# Patient Record
Sex: MALE | Race: WHITE | NOT HISPANIC OR LATINO | Employment: FULL TIME | URBAN - NONMETROPOLITAN AREA
[De-identification: names, ages, dates, MRNs, and addresses within clinical notes are randomized per-mention and may not be internally consistent; named-entity substitution may affect disease eponyms.]

---

## 2019-05-28 ENCOUNTER — HOSPITAL ENCOUNTER (OUTPATIENT)
Dept: GENERAL RADIOLOGY | Facility: HOSPITAL | Age: 29
Discharge: HOME | End: 2019-05-28

## 2019-05-28 ENCOUNTER — HOSPITAL ENCOUNTER (OUTPATIENT)
Dept: GENERAL RADIOLOGY | Facility: HOSPITAL | Age: 29
Discharge: HOME | End: 2019-05-28
Admitting: NURSE PRACTITIONER

## 2019-05-28 ENCOUNTER — TRANSCRIBE ORDERS (OUTPATIENT)
Dept: GENERAL RADIOLOGY | Facility: HOSPITAL | Age: 29
End: 2019-05-28

## 2019-05-28 DIAGNOSIS — M25.571 RIGHT ANKLE PAIN, UNSPECIFIED CHRONICITY: Primary | ICD-10-CM

## 2019-05-28 DIAGNOSIS — M25.579 PAIN IN JOINT INVOLVING ANKLE AND FOOT: ICD-10-CM

## 2019-05-28 DIAGNOSIS — M79.673 PAIN OF FOOT, UNSPECIFIED LATERALITY: ICD-10-CM

## 2019-05-28 PROCEDURE — 73610 X-RAY EXAM OF ANKLE: CPT

## 2019-05-28 PROCEDURE — 73630 X-RAY EXAM OF FOOT: CPT

## 2020-08-18 ENCOUNTER — HOSPITAL ENCOUNTER (EMERGENCY)
Facility: HOSPITAL | Age: 30
Discharge: HOME | End: 2020-08-19
Attending: EMERGENCY MEDICINE | Admitting: EMERGENCY MEDICINE

## 2020-08-18 VITALS
RESPIRATION RATE: 18 BRPM | BODY MASS INDEX: 21.87 KG/M2 | SYSTOLIC BLOOD PRESSURE: 127 MMHG | WEIGHT: 165 LBS | HEART RATE: 121 BPM | HEIGHT: 73 IN | DIASTOLIC BLOOD PRESSURE: 85 MMHG | OXYGEN SATURATION: 98 % | TEMPERATURE: 99.3 F

## 2020-08-18 DIAGNOSIS — R00.0 TACHYCARDIA: ICD-10-CM

## 2020-08-18 DIAGNOSIS — R05.9 COUGH: ICD-10-CM

## 2020-08-18 DIAGNOSIS — Z20.822 PERSON UNDER INVESTIGATION FOR COVID-19: Primary | ICD-10-CM

## 2020-08-18 DIAGNOSIS — R50.9 FEVER AND CHILLS: ICD-10-CM

## 2020-08-18 PROCEDURE — 99283 EMERGENCY DEPT VISIT LOW MDM: CPT

## 2020-08-19 ENCOUNTER — APPOINTMENT (OUTPATIENT)
Dept: GENERAL RADIOLOGY | Facility: HOSPITAL | Age: 30
End: 2020-08-19

## 2020-08-19 LAB
REF LAB TEST METHOD: NORMAL
SARS-COV-2 RNA RESP QL NAA+PROBE: NOT DETECTED

## 2020-08-19 PROCEDURE — U0004 COV-19 TEST NON-CDC HGH THRU: HCPCS | Performed by: EMERGENCY MEDICINE

## 2020-08-19 PROCEDURE — 71045 X-RAY EXAM CHEST 1 VIEW: CPT

## 2020-08-19 PROCEDURE — U0002 COVID-19 LAB TEST NON-CDC: HCPCS | Performed by: EMERGENCY MEDICINE

## 2020-08-19 RX ORDER — ACETAMINOPHEN 500 MG
1000 TABLET ORAL ONCE
Status: COMPLETED | OUTPATIENT
Start: 2020-08-19 | End: 2020-08-19

## 2020-08-19 RX ADMIN — ACETAMINOPHEN 1000 MG: 500 TABLET, FILM COATED ORAL at 01:53

## 2020-08-19 RX ADMIN — SODIUM CHLORIDE 1000 ML: 9 INJECTION, SOLUTION INTRAVENOUS at 01:53

## 2020-08-19 NOTE — ED PROVIDER NOTES
EMERGENCY DEPARTMENT ENCOUNTER      Pt Name: Gustavo Ocampo  MRN: [id number]  YOB: 1990  Date of evaluation: 8/18/2020  Provider: Khoi Trevino MD    CHIEF COMPLAINT       Chief Complaint   Patient presents with   • Exposure To Known Illness         HISTORY OF PRESENT ILLNESS  (Location/Symptom, Timing/Onset, Context/Setting, Quality, Duration, Modifying Factors, Severity.)   Gustavo Ocampo is a 30 y.o. male who presents to the emergency department with 3 days of progressive cough, body aches, fever to 102 without any associated abdominal pain, vomiting, or diarrhea all of which is moderate in severity and is improved with Tylenol and Motrin.  Patient works at [employer] and so he is concerned that he may have been exposed to COVID-19.  He has some sore throat as well as well as mild headache.  Denies any other infectious exposures.  Denies any significant shortness of breath or chest pain.      Nursing notes were reviewed.    REVIEW OF SYSTEMS    (2-9 systems for level 4, 10 or more for level 5)   ROS:  General:  + fever  Cardiovascular:  No chest pain, no palpitations  Respiratory:  + cough  Gastrointestinal:  No pain, no nausea, no vomiting, no diarrhea  Musculoskeletal:  No muscle pain, no joint pain  Skin:  No rash, no easy bruising  Neurologic:  No speech problems,no extremity numbness, no extremity tingling, no extremity weakness  Psychiatric:  No anxiety  Genitourinary:  No dysuria, no hematuria    Except as noted above the remainder of the review of systems was reviewed and negative.       PAST MEDICAL HISTORY   No past medical history on file.      SURGICAL HISTORY       Past Surgical History:   Procedure Laterality Date   • EAR TUBES      x 3   • HERNIA REPAIR      abd         CURRENT MEDICATIONS     No current facility-administered medications for this encounter.     Current Outpatient Medications:   •  brompheniramine-pseudoephedrine-DM 30-2-10 MG/5ML syrup, Take 10 mL by mouth 4 (Four)  "Times a Day As Needed for Congestion or Cough., Disp: 118 mL, Rfl: 0  •  cetirizine (zyrTEC) 10 MG tablet, Take 1 tablet by mouth Daily., Disp: 30 tablet, Rfl: 0  •  TRUVADA 200-300 MG per tablet, , Disp: , Rfl: 0    ALLERGIES     Influenza vaccines    FAMILY HISTORY       Family History   Problem Relation Age of Onset   • No Known Problems Mother    • No Known Problems Father           SOCIAL HISTORY       Social History     Socioeconomic History   • Marital status: Single     Spouse name: Not on file   • Number of children: Not on file   • Years of education: Not on file   • Highest education level: Not on file   Tobacco Use   • Smoking status: Never Smoker   • Smokeless tobacco: Never Used   Substance and Sexual Activity   • Alcohol use: Yes         PHYSICAL EXAM    (up to 7 for level 4, 8 or more for level 5)     Vitals:    08/18/20 2309   BP: 127/85   BP Location: Left arm   Patient Position: Sitting   Pulse: (!) 121   Resp: 18   Temp: 99.3 °F (37.4 °C)   SpO2: 98%   Weight: 74.8 kg (165 lb)   Height: 185.4 cm (73\")       Physical Exam  General: Awake, alert, no acute distress.  HEENT: Conjunctiva normal.  Neck: Trachea midline.  Cardiac: Heart regular rate, rhythm, no murmurs, rubs, or gallops  Lungs: Lungs are clear to auscultation, there is no wheezing, rhonchi, or rales. There is no use of accessory muscles.  Chest wall: There is no tenderness to palpation over the chest wall or over ribs  Abdomen: Abdomen is soft, nontender, nondistended. There is no firm or pulsatile masses, no rebound rigidity or guarding.   Musculoskeletal: No deformity.  Neuro: Alert and oriented x 4.  Dermatology: Skin is warm and dry  Psych: Mentation is grossly normal, cognition is grossly normal. Affect is appropriate.        DIAGNOSTIC RESULTS       RADIOLOGY:   Non-plain film images such as CT, Ultrasound and MRI are read by the radiologist. Plain radiographic images are visualized and preliminarily interpreted by the emergency " "physician with the below findings:      [x] Radiologist's Report Reviewed:  XR Chest 1 View   Final Result   No acute cardiopulmonary findings.      Signer Name: Sawyer Garcia MD    Signed: 8/19/2020 2:04 AM    Workstation Name: [email or web address]     [facility]                EMERGENCY DEPARTMENT COURSE and DIFFERENTIAL DIAGNOSIS/MDM:   Vitals:    Vitals:    08/18/20 2309   BP: 127/85   BP Location: Left arm   Patient Position: Sitting   Pulse: (!) 121   Resp: 18   Temp: 99.3 °F (37.4 °C)   SpO2: 98%   Weight: 74.8 kg (165 lb)   Height: 185.4 cm (73\")       ED Course as of Aug 19 0329   Wed Aug 19, 2020   0314 XR reviewed and negative for acute process    [NS]   0323 Patient continues to be very well-appearing.  His tachycardia has resolved.  I have canceled laboratory studies at this time and advised him on self-isolation until we obtain his COVID swab results.    [NS]      ED Course User Index  [NS] Khoi Trevino MD       Patient well-appearing throughout the duration of his stay in the emergency department.  He initially was slightly tachycardic, however this was upon arrival.  This resolved after administration of fluids and Tylenol.  Patient is nontoxic-appearing and clinically doubt systemic infection/sepsis.  His chest x-ray is clear for pneumonia, pneumothorax, or other acute thoracic process.  High suspicion for COVID-19 given symptomatology.  He has no abdominal pain or tenderness on examination to suggest acute abdominal process such as cholecystitis or appendicitis.  Do not feel this represents other cause of flulike syndrome such as epidural abscess or acute HIV.  Patient be discharged to home isolation.    I had a discussion with the patient/family regarding diagnosis, diagnostic results, treatment plan, and medications.  The patient/family indicated understanding of these instructions.  I spent adequate time at the bedside proceeding discharge necessary to personally discuss the " aftercare instructions, giving patient education, providing explanations of the results of our evaluations/findings, and my decision making to assure that the patient/family understand the plan of care.  Time was allotted to answer questions at that time and throughout the ED course.  Emphasis was placed on timely follow-up after discharge.  I also discussed the potential for the development of an acute emergent condition requiring further evaluation, admission, or even surgical intervention. I discussed that we found nothing during the visit today indicating the need for further workup, admission, or the presence of an unstable medical condition.  I encouraged the patient to return to the emergency department immediately for ANY concerns, worsening, new complaints, or if symptoms persist and unable to seek follow-up in a timely fashion.  The patient/family expressed understanding and agreement with this plan.  The patient will follow-up with their PCP in 1-2 days for reevaluation.       MEDICATIONS ADMINISTERED IN ED:  Medications   sodium chloride 0.9 % bolus 1,000 mL (1,000 mL Intravenous New Bag 8/19/20 0153)   acetaminophen (TYLENOL) tablet 1,000 mg (1,000 mg Oral Given 8/19/20 0153)             FINAL IMPRESSION      1. Person under investigation for COVID-19    2. Fever and chills    3. Cough    4. Tachycardia          DISPOSITION/PLAN     ED Disposition     ED Disposition Condition Comment    Discharge Stable           PATIENT REFERRED TO:  Maty Peralta MD  [address] [facility] [address] [phone]    In 1 week      [facility] Emergency Department  [address] [facility] [place] [address] [phone]    If symptoms worsen      DISCHARGE MEDICATIONS:     Medication List      CONTINUE taking these medications    brompheniramine-pseudoephedrine-DM 30-2-10 MG/5ML syrup  Take 10 mL by mouth 4 (Four) Times a Day As Needed for Congestion or   Cough.     cetirizine 10  MG tablet  Commonly known as:  zyrTEC  Take 1 tablet by mouth Daily.     Truvada 200-300 MG per tablet  Generic drug:  emtricitabine-tenofovir              Comment: Please note this report has been produced using speech recognition software.      Khoi Trevino MD  Attending Emergency Physician               Khoi Trevino MD  08/19/20 8573

## 2021-04-23 ENCOUNTER — APPOINTMENT (OUTPATIENT)
Dept: GENERAL RADIOLOGY | Facility: HOSPITAL | Age: 31
End: 2021-04-23

## 2021-04-23 ENCOUNTER — HOSPITAL ENCOUNTER (EMERGENCY)
Facility: HOSPITAL | Age: 31
Discharge: HOME | End: 2021-04-23
Attending: EMERGENCY MEDICINE | Admitting: EMERGENCY MEDICINE

## 2021-04-23 VITALS
HEART RATE: 73 BPM | BODY MASS INDEX: 21.87 KG/M2 | HEIGHT: 73 IN | DIASTOLIC BLOOD PRESSURE: 80 MMHG | TEMPERATURE: 98.1 F | OXYGEN SATURATION: 97 % | RESPIRATION RATE: 18 BRPM | WEIGHT: 165 LBS | SYSTOLIC BLOOD PRESSURE: 131 MMHG
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
## 2021-04-23 DIAGNOSIS — S20.212A CONTUSION OF RIB ON LEFT SIDE, INITIAL ENCOUNTER: ICD-10-CM

## 2021-04-23 DIAGNOSIS — S39.012A LUMBAR STRAIN, INITIAL ENCOUNTER: Primary | ICD-10-CM

## 2021-04-23 PROCEDURE — 72100 X-RAY EXAM L-S SPINE 2/3 VWS: CPT

## 2021-04-23 PROCEDURE — 99282 EMERGENCY DEPT VISIT SF MDM: CPT

## 2021-04-23 PROCEDURE — 71046 X-RAY EXAM CHEST 2 VIEWS: CPT

## 2021-04-23 NOTE — DISCHARGE INSTRUCTIONS
Rest.  Take over the counter ibuprofen or Aleve as directed for pain.  Follow up with your PCP or [facility] if symptoms persist.

## 2021-04-23 NOTE — ED PROVIDER NOTES
"Subjective   30-year-old male presents to the emergency department for evaluation after a slip and fall at work.  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  He landed on his bottom.  He complains of low back pain and some rib \"soreness\", mostly on the left.  No loss of consciousness.  He did not strike his head.  No neck pain.  No shortness of breath.  No abdominal pain.  The patient states that he broke a glass beaker when he fell.  He sustained 2 tiny lacerations to the dorsal aspect of both hands.  No active bleeding.  No retained foreign body.  The patient is up-to-date on his tetanus.  He denies any other injury.          Review of Systems   Constitutional: Negative for chills and fever.   HENT: Negative for sore throat.    Respiratory: Negative for cough and shortness of breath.         Left rib soreness   Cardiovascular: Negative for chest pain.   Gastrointestinal: Negative for abdominal pain, diarrhea, nausea and vomiting.   Genitourinary: Negative for dysuria.   Musculoskeletal: Positive for back pain.   Skin: Positive for wound (Tiny lacerations to both hands).   Neurological: Negative for dizziness, light-headedness, numbness and headaches.   Hematological: Negative.    Psychiatric/Behavioral: Negative.        History reviewed. No pertinent past medical history.    Allergies   Allergen Reactions   • Influenza Vaccines Rash       Past Surgical History:   Procedure Laterality Date   • EAR TUBES      x 3   • HERNIA REPAIR      abd       Family History   Problem Relation Age of Onset   • No Known Problems Mother    • No Known Problems Father        Social History     Socioeconomic History   • Marital status: Single     Spouse name: Not on file   • Number of children: Not on file   • Years of education: Not on file   • Highest education level: Not on file   Tobacco Use   • Smoking status: Never Smoker   • Smokeless tobacco: Never Used   Substance " and Sexual Activity   • Alcohol use: Yes     Alcohol/week: 1.0 standard drinks     Types: 1 Cans of beer per week   • Drug use: Never           Objective   Physical Exam  Constitutional:       Appearance: Normal appearance.   HENT:      Head: Normocephalic and atraumatic.      Nose: Nose normal.      Mouth/Throat:      Mouth: Mucous membranes are moist.   Eyes:      Conjunctiva/sclera: Conjunctivae normal.      Pupils: Pupils are equal, round, and reactive to light.   Cardiovascular:      Rate and Rhythm: Normal rate and regular rhythm.      Pulses: Normal pulses.      Heart sounds: Normal heart sounds.   Pulmonary:      Effort: Pulmonary effort is normal.      Breath sounds: Normal breath sounds.      Comments: Mild soreness on palpation over the left lateral ribs.  No crepitus.  Abdominal:      General: Bowel sounds are normal.      Tenderness: There is no abdominal tenderness. There is no guarding.   Musculoskeletal:         General: Normal range of motion.      Cervical back: Normal range of motion and neck supple. No tenderness.      Comments: Mild tenderness on palpation over the lower lumbar spine.  No point tenderness.   Skin:     General: Skin is warm and dry.      Comments: 2 tiny subcentimeter lacerations to the dorsum of the left and right hand.  No active bleeding.  No foreign body.   Neurological:      General: No focal deficit present.      Mental Status: He is alert and oriented to person, place, and time.   Psychiatric:         Mood and Affect: Mood normal.         Procedures           ED Course      Lumbar spine films show no evidence of fracture or malalignment.  Chest x-ray shows no evidence of rib fracture or pneumothorax or other acute process.  The patient be discharged home to take over-the-counter ibuprofen or Aleve as directed for pain.                                       MDM    Final diagnoses:   Lumbar strain, initial encounter   Contusion of rib on left side, initial encounter   Fall,  initial encounter       ED Disposition  ED Disposition     ED Disposition Condition Comment    Discharge Stable           [facility] OCCUPATIONAL MEDICINE  [address]  [facility] [id number] [phone]    As needed    [facility] Emergency Department  [address] [facility] [address] [phone]    If symptoms worsen         Medication List      No changes were made to your prescriptions during this visit.          Roverto Arredondo, PA  04/23/21 1340

## 2021-07-20 PROCEDURE — 96372 THER/PROPH/DIAG INJ SC/IM: CPT

## 2021-07-20 PROCEDURE — 99283 EMERGENCY DEPT VISIT LOW MDM: CPT

## 2021-07-20 RX ORDER — AZITHROMYCIN 250 MG/1
1000 TABLET, FILM COATED ORAL ONCE
Status: COMPLETED | OUTPATIENT
Start: 2021-07-20 | End: 2021-07-21

## 2021-07-20 RX ORDER — CEFTRIAXONE 500 MG/1
500 INJECTION, POWDER, FOR SOLUTION INTRAMUSCULAR; INTRAVENOUS ONCE
Status: COMPLETED | OUTPATIENT
Start: 2021-07-20 | End: 2021-07-21

## 2021-07-20 RX ORDER — LIDOCAINE HYDROCHLORIDE 10 MG/ML
2.1 INJECTION, SOLUTION EPIDURAL; INFILTRATION; INTRACAUDAL; PERINEURAL ONCE
Status: COMPLETED | OUTPATIENT
Start: 2021-07-20 | End: 2021-07-21

## 2021-07-20 RX ORDER — CEFTRIAXONE 500 MG/1
500 INJECTION, POWDER, FOR SOLUTION INTRAMUSCULAR; INTRAVENOUS ONCE
Status: DISCONTINUED | OUTPATIENT
Start: 2021-07-20 | End: 2021-07-20 | Stop reason: ALTCHOICE

## 2021-07-21 ENCOUNTER — HOSPITAL ENCOUNTER (EMERGENCY)
Facility: HOSPITAL | Age: 31
Discharge: HOME | End: 2021-07-21
Attending: EMERGENCY MEDICINE | Admitting: EMERGENCY MEDICINE

## 2021-07-21 VITALS
HEIGHT: 73 IN | BODY MASS INDEX: 21.87 KG/M2 | OXYGEN SATURATION: 97 % | DIASTOLIC BLOOD PRESSURE: 81 MMHG | WEIGHT: 165 LBS | SYSTOLIC BLOOD PRESSURE: 122 MMHG | TEMPERATURE: 98.3 F | RESPIRATION RATE: 16 BRPM | HEART RATE: 72 BPM

## 2021-07-21 DIAGNOSIS — Z20.2 EXPOSURE TO SYPHILIS: Primary | ICD-10-CM

## 2021-07-21 LAB
BILIRUB UR QL STRIP: NEGATIVE
CLARITY UR: CLEAR
COLOR UR: YELLOW
GLUCOSE UR STRIP-MCNC: NEGATIVE MG/DL
HGB UR QL STRIP.AUTO: NEGATIVE
KETONES UR QL STRIP: NEGATIVE
LEUKOCYTE ESTERASE UR QL STRIP.AUTO: NEGATIVE
NITRITE UR QL STRIP: NEGATIVE
PH UR STRIP.AUTO: 6 [PH] (ref 5–8)
PROT UR QL STRIP: NEGATIVE
RPR SER QL: NORMAL
SP GR UR STRIP: 1.02 (ref 1–1.03)
UROBILINOGEN UR QL STRIP: NORMAL

## 2021-07-21 PROCEDURE — 81003 URINALYSIS AUTO W/O SCOPE: CPT | Performed by: EMERGENCY MEDICINE

## 2021-07-21 PROCEDURE — 87491 CHLMYD TRACH DNA AMP PROBE: CPT | Performed by: EMERGENCY MEDICINE

## 2021-07-21 PROCEDURE — 25010000002 CEFTRIAXONE PER 250 MG: Performed by: EMERGENCY MEDICINE

## 2021-07-21 PROCEDURE — 87591 N.GONORRHOEAE DNA AMP PROB: CPT | Performed by: EMERGENCY MEDICINE

## 2021-07-21 PROCEDURE — 25010000002 PENICILLIN G BENZATHINE PER 1200000 UNITS: Performed by: EMERGENCY MEDICINE

## 2021-07-21 PROCEDURE — 86592 SYPHILIS TEST NON-TREP QUAL: CPT | Performed by: EMERGENCY MEDICINE

## 2021-07-21 PROCEDURE — 96372 THER/PROPH/DIAG INJ SC/IM: CPT

## 2021-07-21 RX ADMIN — AZITHROMYCIN MONOHYDRATE 1000 MG: 250 TABLET ORAL at 00:44

## 2021-07-21 RX ADMIN — PENICILLIN G BENZATHINE 2.4 MILLION UNITS: 1200000 INJECTION, SUSPENSION INTRAMUSCULAR at 01:16

## 2021-07-21 RX ADMIN — CEFTRIAXONE SODIUM 500 MG: 500 INJECTION, POWDER, FOR SOLUTION INTRAMUSCULAR; INTRAVENOUS at 00:44

## 2021-07-21 RX ADMIN — LIDOCAINE HYDROCHLORIDE 2.1 ML: 10 INJECTION, SOLUTION EPIDURAL; INFILTRATION; INTRACAUDAL; PERINEURAL at 00:45

## 2021-07-21 NOTE — ED PROVIDER NOTES
Subjective   31-year-old male presents for evaluation following STD exposure.  Of note, the patient states that his partner was recently diagnosed with syphilis.  As result, the patient came to the emergency department for testing today.  He denies any urinary symptoms.  No penile discharge.  No rashes.  He is completely asymptomatic at this time and has no complaints.          Review of Systems   Constitutional: Negative for fever.   Gastrointestinal: Negative for abdominal pain, nausea and vomiting.   Genitourinary: Negative for discharge, genital sores, penile pain, penile swelling, scrotal swelling and testicular pain.   Musculoskeletal: Negative for back pain.       No past medical history on file.    Allergies   Allergen Reactions   • Influenza Vaccines Rash       Past Surgical History:   Procedure Laterality Date   • EAR TUBES      x 3   • HERNIA REPAIR      abd       Family History   Problem Relation Age of Onset   • No Known Problems Mother    • No Known Problems Father        Social History     Socioeconomic History   • Marital status: Single     Spouse name: Not on file   • Number of children: Not on file   • Years of education: Not on file   • Highest education level: Not on file   Tobacco Use   • Smoking status: Never Smoker   • Smokeless tobacco: Never Used   Vaping Use   • Vaping Use: Never used   Substance and Sexual Activity   • Alcohol use: Yes     Alcohol/week: 1.0 standard drinks     Types: 1 Cans of beer per week   • Drug use: Never   • Sexual activity: Yes     Partners: Male     Birth control/protection: Other           Objective   Physical Exam  Vitals and nursing note reviewed.   Constitutional:       General: He is not in acute distress.     Appearance: Normal appearance. He is well-developed. He is not diaphoretic.      Comments: Well-appearing male in no acute distress   HENT:      Head: Normocephalic and atraumatic.      Comments: No mucous membrane lesions present  Neck:      Vascular:  No JVD.   Cardiovascular:      Rate and Rhythm: Normal rate and regular rhythm.      Heart sounds: Normal heart sounds. No murmur heard.   No friction rub. No gallop.    Pulmonary:      Effort: Pulmonary effort is normal. No respiratory distress.      Breath sounds: Normal breath sounds. No wheezing or rales.   Abdominal:      General: Bowel sounds are normal. There is no distension.      Palpations: Abdomen is soft. There is no mass.      Tenderness: There is no abdominal tenderness. There is no guarding.   Genitourinary:     Comments: Unremarkable  exam, no penile discharge present, no  lesions or rashes present  Musculoskeletal:         General: Normal range of motion.   Skin:     General: Skin is warm and dry.      Coloration: Skin is not pale.      Findings: No erythema or rash.   Neurological:      Mental Status: He is alert and oriented to person, place, and time.      Comments: Normal gait   Psychiatric:         Mood and Affect: Mood normal.         Thought Content: Thought content normal.         Judgment: Judgment normal.         Procedures           ED Course  ED Course as of Jul 21 0830   Wed Jul 21, 2021   0829 31-year-old male presents for evaluation following STD exposure.  The patient's sexual partner was recently diagnosed with syphilis.  On arrival to the ED, the patient is very well-appearing with benign exam and reassuring vital signs.  He is completely asymptomatic at this time.  RPR and chlamydia/gonorrhea testing obtained.  Patient treated empirically for sexually transmitted infections per CDC guidelines.  Results are pending at this time.  Patient will be contacted if the results are positive.  He will follow-up with his primary care physician within the next week.  Agreeable with plan and given appropriate strict return precautions.    [DD]      ED Course User Index  [DD] Barron Ceballos MD                                         Recent Results (from the past 24 hour(s))  "  Urinalysis With Microscopic If Indicated (No Culture) - Urine, Clean Catch    Collection Time: 07/21/21 12:13 AM    Specimen: Urine, Clean Catch   Result Value Ref Range    Color, UA Yellow Yellow, Straw    Appearance, UA Clear Clear    pH, UA 6.0 5.0 - 8.0    Specific Gravity, UA 1.023 1.001 - 1.030    Glucose, UA Negative Negative    Ketones, UA Negative Negative    Bilirubin, UA Negative Negative    Blood, UA Negative Negative    Protein, UA Negative Negative    Leuk Esterase, UA Negative Negative    Nitrite, UA Negative Negative    Urobilinogen, UA 1.0 E.U./dL 0.2 - 1.0 E.U./dL     Note: In addition to lab results from this visit, the labs listed above may include labs taken at another facility or during a different encounter within the last 24 hours. Please correlate lab times with ED admission and discharge times for further clarification of the services performed during this visit.    No orders to display     Vitals:    07/20/21 2000 07/21/21 0100   BP: 123/82 122/81   BP Location: Left arm Right arm   Patient Position: Sitting Sitting   Pulse: 68 72   Resp: 18 16   Temp: 98.3 °F (36.8 °C)    TempSrc: Oral    SpO2: 97%    Weight: 74.8 kg (165 lb)    Height: 185.4 cm (73\")      Medications   azithromycin (ZITHROMAX) tablet 1,000 mg (1,000 mg Oral Given 7/21/21 0044)   cefTRIAXone (ROCEPHIN) injection 500 mg (500 mg Intramuscular Given 7/21/21 0044)   lidocaine PF 1% (XYLOCAINE) injection 2.1 mL (2.1 mL Injection Given 7/21/21 0045)   penicillin G benzathine (BICILLIN-LA) injection 2.4 Million Units (2.4 Million Units Intramuscular Given 7/21/21 0116)     ECG/EMG Results (last 24 hours)     ** No results found for the last 24 hours. **        No orders to display           MDM    Final diagnoses:   Exposure to syphilis       ED Disposition  ED Disposition     ED Disposition Condition Comment    Discharge Stable           Maty Peralta MD  [address] [facility] [address]  [phone]    In 1 " week           Medication List      No changes were made to your prescriptions during this visit.          Barron Ceballos MD  07/21/21 0871

## 2021-07-22 LAB
C TRACH RRNA SPEC QL NAA+PROBE: NEGATIVE
N GONORRHOEA RRNA SPEC QL NAA+PROBE: NEGATIVE